# Patient Record
Sex: MALE | Race: OTHER | HISPANIC OR LATINO | URBAN - METROPOLITAN AREA
[De-identification: names, ages, dates, MRNs, and addresses within clinical notes are randomized per-mention and may not be internally consistent; named-entity substitution may affect disease eponyms.]

---

## 2022-04-08 ENCOUNTER — INPATIENT (INPATIENT)
Facility: HOSPITAL | Age: 57
LOS: 1 days | Discharge: HOME | DRG: 871 | End: 2022-04-10
Attending: STUDENT IN AN ORGANIZED HEALTH CARE EDUCATION/TRAINING PROGRAM | Admitting: STUDENT IN AN ORGANIZED HEALTH CARE EDUCATION/TRAINING PROGRAM
Payer: MEDICAID

## 2022-04-08 VITALS
TEMPERATURE: 102 F | WEIGHT: 202.83 LBS | DIASTOLIC BLOOD PRESSURE: 84 MMHG | RESPIRATION RATE: 19 BRPM | SYSTOLIC BLOOD PRESSURE: 145 MMHG | HEIGHT: 67.72 IN | HEART RATE: 119 BPM | OXYGEN SATURATION: 96 %

## 2022-04-08 DIAGNOSIS — G44.209 TENSION-TYPE HEADACHE, UNSPECIFIED, NOT INTRACTABLE: ICD-10-CM

## 2022-04-08 DIAGNOSIS — J18.9 PNEUMONIA, UNSPECIFIED ORGANISM: ICD-10-CM

## 2022-04-08 DIAGNOSIS — A41.50 GRAM-NEGATIVE SEPSIS, UNSPECIFIED: ICD-10-CM

## 2022-04-08 DIAGNOSIS — M54.9 DORSALGIA, UNSPECIFIED: ICD-10-CM

## 2022-04-08 DIAGNOSIS — Z29.9 ENCOUNTER FOR PROPHYLACTIC MEASURES, UNSPECIFIED: ICD-10-CM

## 2022-04-08 LAB
ALBUMIN SERPL ELPH-MCNC: 3.1 G/DL — LOW (ref 3.5–5)
ALP SERPL-CCNC: 84 U/L — SIGNIFICANT CHANGE UP (ref 40–120)
ALT FLD-CCNC: 26 U/L DA — SIGNIFICANT CHANGE UP (ref 10–60)
ANION GAP SERPL CALC-SCNC: 5 MMOL/L — SIGNIFICANT CHANGE UP (ref 5–17)
APPEARANCE UR: CLEAR — SIGNIFICANT CHANGE UP
AST SERPL-CCNC: 20 U/L — SIGNIFICANT CHANGE UP (ref 10–40)
BASOPHILS # BLD AUTO: 0.02 K/UL — SIGNIFICANT CHANGE UP (ref 0–0.2)
BASOPHILS NFR BLD AUTO: 0.1 % — SIGNIFICANT CHANGE UP (ref 0–2)
BILIRUB SERPL-MCNC: 0.5 MG/DL — SIGNIFICANT CHANGE UP (ref 0.2–1.2)
BILIRUB UR-MCNC: NEGATIVE — SIGNIFICANT CHANGE UP
BUN SERPL-MCNC: 13 MG/DL — SIGNIFICANT CHANGE UP (ref 7–18)
CALCIUM SERPL-MCNC: 9.2 MG/DL — SIGNIFICANT CHANGE UP (ref 8.4–10.5)
CHLORIDE SERPL-SCNC: 107 MMOL/L — SIGNIFICANT CHANGE UP (ref 96–108)
CO2 SERPL-SCNC: 25 MMOL/L — SIGNIFICANT CHANGE UP (ref 22–31)
COLOR SPEC: YELLOW — SIGNIFICANT CHANGE UP
CREAT SERPL-MCNC: 1 MG/DL — SIGNIFICANT CHANGE UP (ref 0.5–1.3)
DIFF PNL FLD: NEGATIVE — SIGNIFICANT CHANGE UP
EGFR: 88 ML/MIN/1.73M2 — SIGNIFICANT CHANGE UP
EOSINOPHIL # BLD AUTO: 0.01 K/UL — SIGNIFICANT CHANGE UP (ref 0–0.5)
EOSINOPHIL NFR BLD AUTO: 0.1 % — SIGNIFICANT CHANGE UP (ref 0–6)
FLUAV AG NPH QL: SIGNIFICANT CHANGE UP
FLUBV AG NPH QL: SIGNIFICANT CHANGE UP
GLUCOSE SERPL-MCNC: 149 MG/DL — HIGH (ref 70–99)
GLUCOSE UR QL: NEGATIVE — SIGNIFICANT CHANGE UP
HCT VFR BLD CALC: 39.1 % — SIGNIFICANT CHANGE UP (ref 39–50)
HGB BLD-MCNC: 14.2 G/DL — SIGNIFICANT CHANGE UP (ref 13–17)
IMM GRANULOCYTES NFR BLD AUTO: 0.4 % — SIGNIFICANT CHANGE UP (ref 0–1.5)
KETONES UR-MCNC: NEGATIVE — SIGNIFICANT CHANGE UP
LACTATE SERPL-SCNC: 1.5 MMOL/L — SIGNIFICANT CHANGE UP (ref 0.7–2)
LEUKOCYTE ESTERASE UR-ACNC: NEGATIVE — SIGNIFICANT CHANGE UP
LYMPHOCYTES # BLD AUTO: 1.07 K/UL — SIGNIFICANT CHANGE UP (ref 1–3.3)
LYMPHOCYTES # BLD AUTO: 7.3 % — LOW (ref 13–44)
MCHC RBC-ENTMCNC: 29.8 PG — SIGNIFICANT CHANGE UP (ref 27–34)
MCHC RBC-ENTMCNC: 36.3 GM/DL — HIGH (ref 32–36)
MCV RBC AUTO: 82.1 FL — SIGNIFICANT CHANGE UP (ref 80–100)
MONOCYTES # BLD AUTO: 0.87 K/UL — SIGNIFICANT CHANGE UP (ref 0–0.9)
MONOCYTES NFR BLD AUTO: 6 % — SIGNIFICANT CHANGE UP (ref 2–14)
NEUTROPHILS # BLD AUTO: 12.54 K/UL — HIGH (ref 1.8–7.4)
NEUTROPHILS NFR BLD AUTO: 86.1 % — HIGH (ref 43–77)
NITRITE UR-MCNC: NEGATIVE — SIGNIFICANT CHANGE UP
NRBC # BLD: 0 /100 WBCS — SIGNIFICANT CHANGE UP (ref 0–0)
PH UR: 7 — SIGNIFICANT CHANGE UP (ref 5–8)
PLATELET # BLD AUTO: 220 K/UL — SIGNIFICANT CHANGE UP (ref 150–400)
POTASSIUM SERPL-MCNC: 4.3 MMOL/L — SIGNIFICANT CHANGE UP (ref 3.5–5.3)
POTASSIUM SERPL-SCNC: 4.3 MMOL/L — SIGNIFICANT CHANGE UP (ref 3.5–5.3)
PROT SERPL-MCNC: 8.2 G/DL — SIGNIFICANT CHANGE UP (ref 6–8.3)
PROT UR-MCNC: 15
RBC # BLD: 4.76 M/UL — SIGNIFICANT CHANGE UP (ref 4.2–5.8)
RBC # FLD: 12.3 % — SIGNIFICANT CHANGE UP (ref 10.3–14.5)
SARS-COV-2 RNA SPEC QL NAA+PROBE: SIGNIFICANT CHANGE UP
SODIUM SERPL-SCNC: 137 MMOL/L — SIGNIFICANT CHANGE UP (ref 135–145)
SP GR SPEC: 1.01 — SIGNIFICANT CHANGE UP (ref 1.01–1.02)
UROBILINOGEN FLD QL: NEGATIVE — SIGNIFICANT CHANGE UP
WBC # BLD: 14.57 K/UL — HIGH (ref 3.8–10.5)
WBC # FLD AUTO: 14.57 K/UL — HIGH (ref 3.8–10.5)

## 2022-04-08 PROCEDURE — 71260 CT THORAX DX C+: CPT | Mod: 26,MA

## 2022-04-08 PROCEDURE — 99223 1ST HOSP IP/OBS HIGH 75: CPT | Mod: GC

## 2022-04-08 PROCEDURE — 71045 X-RAY EXAM CHEST 1 VIEW: CPT | Mod: 26

## 2022-04-08 PROCEDURE — 99284 EMERGENCY DEPT VISIT MOD MDM: CPT

## 2022-04-08 PROCEDURE — 74177 CT ABD & PELVIS W/CONTRAST: CPT | Mod: 26,MA

## 2022-04-08 RX ORDER — HEPARIN SODIUM 5000 [USP'U]/ML
5000 INJECTION INTRAVENOUS; SUBCUTANEOUS EVERY 8 HOURS
Refills: 0 | Status: DISCONTINUED | OUTPATIENT
Start: 2022-04-08 | End: 2022-04-10

## 2022-04-08 RX ORDER — ACETAMINOPHEN 500 MG
650 TABLET ORAL EVERY 6 HOURS
Refills: 0 | Status: DISCONTINUED | OUTPATIENT
Start: 2022-04-08 | End: 2022-04-10

## 2022-04-08 RX ORDER — ACETAMINOPHEN 500 MG
1000 TABLET ORAL ONCE
Refills: 0 | Status: COMPLETED | OUTPATIENT
Start: 2022-04-08 | End: 2022-04-08

## 2022-04-08 RX ORDER — CEFTRIAXONE 500 MG/1
1000 INJECTION, POWDER, FOR SOLUTION INTRAMUSCULAR; INTRAVENOUS ONCE
Refills: 0 | Status: COMPLETED | OUTPATIENT
Start: 2022-04-08 | End: 2022-04-08

## 2022-04-08 RX ORDER — KETOROLAC TROMETHAMINE 30 MG/ML
30 SYRINGE (ML) INJECTION ONCE
Refills: 0 | Status: DISCONTINUED | OUTPATIENT
Start: 2022-04-08 | End: 2022-04-08

## 2022-04-08 RX ORDER — SODIUM CHLORIDE 9 MG/ML
2000 INJECTION INTRAMUSCULAR; INTRAVENOUS; SUBCUTANEOUS ONCE
Refills: 0 | Status: COMPLETED | OUTPATIENT
Start: 2022-04-08 | End: 2022-04-08

## 2022-04-08 RX ORDER — AZITHROMYCIN 500 MG/1
500 TABLET, FILM COATED ORAL EVERY 24 HOURS
Refills: 0 | Status: DISCONTINUED | OUTPATIENT
Start: 2022-04-08 | End: 2022-04-10

## 2022-04-08 RX ORDER — AZITHROMYCIN 500 MG/1
500 TABLET, FILM COATED ORAL ONCE
Refills: 0 | Status: COMPLETED | OUTPATIENT
Start: 2022-04-08 | End: 2022-04-08

## 2022-04-08 RX ORDER — CEFTRIAXONE 500 MG/1
1000 INJECTION, POWDER, FOR SOLUTION INTRAMUSCULAR; INTRAVENOUS EVERY 24 HOURS
Refills: 0 | Status: DISCONTINUED | OUTPATIENT
Start: 2022-04-08 | End: 2022-04-10

## 2022-04-08 RX ORDER — ACETAMINOPHEN 500 MG
325 TABLET ORAL ONCE
Refills: 0 | Status: COMPLETED | OUTPATIENT
Start: 2022-04-08 | End: 2022-04-08

## 2022-04-08 RX ORDER — ACETAMINOPHEN 500 MG
650 TABLET ORAL ONCE
Refills: 0 | Status: COMPLETED | OUTPATIENT
Start: 2022-04-08 | End: 2022-04-08

## 2022-04-08 RX ORDER — KETOROLAC TROMETHAMINE 30 MG/ML
30 SYRINGE (ML) INJECTION EVERY 6 HOURS
Refills: 0 | Status: DISCONTINUED | OUTPATIENT
Start: 2022-04-08 | End: 2022-04-10

## 2022-04-08 RX ADMIN — Medication 30 MILLIGRAM(S): at 07:38

## 2022-04-08 RX ADMIN — SODIUM CHLORIDE 2000 MILLILITER(S): 9 INJECTION INTRAMUSCULAR; INTRAVENOUS; SUBCUTANEOUS at 07:02

## 2022-04-08 RX ADMIN — CEFTRIAXONE 100 MILLIGRAM(S): 500 INJECTION, POWDER, FOR SOLUTION INTRAMUSCULAR; INTRAVENOUS at 07:45

## 2022-04-08 RX ADMIN — Medication 325 MILLIGRAM(S): at 23:18

## 2022-04-08 RX ADMIN — Medication 650 MILLIGRAM(S): at 13:33

## 2022-04-08 RX ADMIN — Medication 1000 MILLIGRAM(S): at 17:01

## 2022-04-08 RX ADMIN — AZITHROMYCIN 255 MILLIGRAM(S): 500 TABLET, FILM COATED ORAL at 10:04

## 2022-04-08 RX ADMIN — Medication 400 MILLIGRAM(S): at 16:31

## 2022-04-08 RX ADMIN — Medication 650 MILLIGRAM(S): at 07:38

## 2022-04-08 RX ADMIN — Medication 650 MILLIGRAM(S): at 20:55

## 2022-04-08 RX ADMIN — HEPARIN SODIUM 5000 UNIT(S): 5000 INJECTION INTRAVENOUS; SUBCUTANEOUS at 21:46

## 2022-04-08 RX ADMIN — HEPARIN SODIUM 5000 UNIT(S): 5000 INJECTION INTRAVENOUS; SUBCUTANEOUS at 13:34

## 2022-04-08 RX ADMIN — Medication 650 MILLIGRAM(S): at 07:10

## 2022-04-08 RX ADMIN — Medication 30 MILLIGRAM(S): at 07:11

## 2022-04-08 RX ADMIN — Medication 650 MILLIGRAM(S): at 14:00

## 2022-04-08 RX ADMIN — Medication 650 MILLIGRAM(S): at 21:55

## 2022-04-08 NOTE — H&P ADULT - PROBLEM SELECTOR PLAN 1
- No SOB or chest pain   - CT scan chest and abdomen which showed Right upper lobe masslike consolidation, compatible with pneumonia.    No acute finding on pelvis or abdomen.   - Patient was tachycardiac to 120s in ED likely due to fever - WBCs 14k  - Normal lactate - Normal BP   - Will start on Rocephin and Zithromax   - Monitor vitals   - f/u Blood and urine culture

## 2022-04-08 NOTE — H&P ADULT - ASSESSMENT
Patient is 56 years old male with past medical hx of kidney infection?, Came to ED due to complaining of headache, fever and back pain. Patient reports that he started feeling back pain few days ago and started on Tylenol PRN. Patient got CT scan chest and abdomen which showed Right upper lobe masslike consolidation, compatible with pneumonia. No acute finding on pelvis or abdomen . Patient was tachycardiac to 120s in ED. EKG showed sinus tachycardia. Patient will be admitted for IV antibiotics.

## 2022-04-08 NOTE — H&P ADULT - PROBLEM SELECTOR PLAN 2
- No findings on CT scan   - Pain is improved; No sensory loss or motor weakness   - If symptoms persists; Will scan Cervical and thoracic V  - Tylenol PRN for Pain and headache

## 2022-04-08 NOTE — ED PROVIDER NOTE - OBJECTIVE STATEMENT
56 year old male denies PMH coming in with 3 days of fever, HA, back pain. no known sick contacts. denies abd pains, N/v/D/C, cp, sob, palpitations, urinary complaints, vision changes, neck pain/stiffness, cough, uri symptoms. 56 year old male denies PMH coming in with 3 days of fever, HA, back pain. no known sick contacts. denies abd pains, N/v/D/C, cp, sob, palpitations, urinary complaints, vision changes, neck pain/stiffness, cough, uri symptoms. of note pt states about a month go had his kidney evaluated and was told he may have an infection there but wasn't given any antibiotics or medications for this and is unsure exactly what was wrong.

## 2022-04-08 NOTE — ED PROVIDER NOTE - PROGRESS NOTE DETAILS
CT confirms RUL consolidation.  Pt admitted for IV abx.  MAR and Dr. Hamlin endorsed. Pt agrees with admission.   I had a detailed discussion with the patient and/or guardian regarding the historical points, exam findings, and any diagnostic results supporting the admit diagnosis.

## 2022-04-08 NOTE — H&P ADULT - NSICDXNOPASTSURGICALHX_GEN_ALL_CORE
How Severe Is The Scar?: moderate
Is This A New Presentation, Or A Follow-Up?: Chest Scar
<-- Click to add NO significant Past Surgical History

## 2022-04-08 NOTE — ED ADULT NURSE NOTE - SUICIDE SCREENING DEPRESSION
Negative Azithromycin Counseling:  I discussed with the patient the risks of azithromycin including but not limited to GI upset, allergic reaction, drug rash, diarrhea, and yeast infections.

## 2022-04-08 NOTE — H&P ADULT - HISTORY OF PRESENT ILLNESS
Patient is 56 years old male with past medical hx of kidney infection?, Came to ED due to complaining of headache, fever and back pain. Patient reports that he started feeling back pain few days ago and started on Tylenol PRN. Patient got CT scan chest and abdomen which showed Right upper lobe masslike consolidation, compatible with pneumonia. No acute finding on pelvis or abdomen . Patient was tachycardiac to 120s in ED. Patient denied any chest pain, shortness of breath, N/V or change in bowel movement.

## 2022-04-08 NOTE — PATIENT PROFILE ADULT - SURGICAL SITE INCISION
"Chief Complaint   Patient presents with     URI     /70   Pulse 100   Temp 98.5  F (36.9  C) (Tympanic)   Resp 16   Ht 1.778 m (5' 10\")   Wt 73.5 kg (162 lb)   LMP  (LMP Unknown)   SpO2 98%   Breastfeeding? No   BMI 23.24 kg/m   Estimated body mass index is 23.24 kg/m  as calculated from the following:    Height as of this encounter: 1.778 m (5' 10\").    Weight as of this encounter: 73.5 kg (162 lb).  BP completed using cuff size: regular   Gretchen Herrera CMA    Health Maintenance Due   Topic Date Due     PREVENTIVE CARE VISIT  1994     INFLUENZA VACCINE (1) 09/01/2018     Health Maintenance reviewed at today's visit patient asked to schedule/complete:   Routine Health Visit: Patient agrees to schedule  Immunizations:  Patient agrees to schedule    "
no

## 2022-04-08 NOTE — ED PROVIDER NOTE - CLINICAL SUMMARY MEDICAL DECISION MAKING FREE TEXT BOX
45 year old male with fever/HA/back pain. PE as above.  labs, UA, antipyretics, cxr, flu/covid swab, reassess

## 2022-04-09 LAB
ALBUMIN SERPL ELPH-MCNC: 2.7 G/DL — LOW (ref 3.5–5)
ALP SERPL-CCNC: 73 U/L — SIGNIFICANT CHANGE UP (ref 40–120)
ALT FLD-CCNC: 22 U/L DA — SIGNIFICANT CHANGE UP (ref 10–60)
ANION GAP SERPL CALC-SCNC: 9 MMOL/L — SIGNIFICANT CHANGE UP (ref 5–17)
AST SERPL-CCNC: 15 U/L — SIGNIFICANT CHANGE UP (ref 10–40)
BASOPHILS # BLD AUTO: 0.03 K/UL — SIGNIFICANT CHANGE UP (ref 0–0.2)
BASOPHILS NFR BLD AUTO: 0.3 % — SIGNIFICANT CHANGE UP (ref 0–2)
BILIRUB SERPL-MCNC: 0.3 MG/DL — SIGNIFICANT CHANGE UP (ref 0.2–1.2)
BUN SERPL-MCNC: 16 MG/DL — SIGNIFICANT CHANGE UP (ref 7–18)
CALCIUM SERPL-MCNC: 8.9 MG/DL — SIGNIFICANT CHANGE UP (ref 8.4–10.5)
CHLORIDE SERPL-SCNC: 107 MMOL/L — SIGNIFICANT CHANGE UP (ref 96–108)
CO2 SERPL-SCNC: 23 MMOL/L — SIGNIFICANT CHANGE UP (ref 22–31)
CREAT SERPL-MCNC: 0.93 MG/DL — SIGNIFICANT CHANGE UP (ref 0.5–1.3)
CULTURE RESULTS: NO GROWTH — SIGNIFICANT CHANGE UP
EGFR: 96 ML/MIN/1.73M2 — SIGNIFICANT CHANGE UP
EOSINOPHIL # BLD AUTO: 0.01 K/UL — SIGNIFICANT CHANGE UP (ref 0–0.5)
EOSINOPHIL NFR BLD AUTO: 0.1 % — SIGNIFICANT CHANGE UP (ref 0–6)
GLUCOSE SERPL-MCNC: 120 MG/DL — HIGH (ref 70–99)
HCT VFR BLD CALC: 36.4 % — LOW (ref 39–50)
HCV AB S/CO SERPL IA: 0.09 S/CO — SIGNIFICANT CHANGE UP (ref 0–0.99)
HCV AB SERPL-IMP: SIGNIFICANT CHANGE UP
HGB BLD-MCNC: 13 G/DL — SIGNIFICANT CHANGE UP (ref 13–17)
IMM GRANULOCYTES NFR BLD AUTO: 0.4 % — SIGNIFICANT CHANGE UP (ref 0–1.5)
LYMPHOCYTES # BLD AUTO: 1.66 K/UL — SIGNIFICANT CHANGE UP (ref 1–3.3)
LYMPHOCYTES # BLD AUTO: 15.6 % — SIGNIFICANT CHANGE UP (ref 13–44)
MAGNESIUM SERPL-MCNC: 1.9 MG/DL — SIGNIFICANT CHANGE UP (ref 1.6–2.6)
MCHC RBC-ENTMCNC: 29.1 PG — SIGNIFICANT CHANGE UP (ref 27–34)
MCHC RBC-ENTMCNC: 35.7 GM/DL — SIGNIFICANT CHANGE UP (ref 32–36)
MCV RBC AUTO: 81.6 FL — SIGNIFICANT CHANGE UP (ref 80–100)
MONOCYTES # BLD AUTO: 0.89 K/UL — SIGNIFICANT CHANGE UP (ref 0–0.9)
MONOCYTES NFR BLD AUTO: 8.4 % — SIGNIFICANT CHANGE UP (ref 2–14)
NEUTROPHILS # BLD AUTO: 8 K/UL — HIGH (ref 1.8–7.4)
NEUTROPHILS NFR BLD AUTO: 75.2 % — SIGNIFICANT CHANGE UP (ref 43–77)
NRBC # BLD: 0 /100 WBCS — SIGNIFICANT CHANGE UP (ref 0–0)
PHOSPHATE SERPL-MCNC: 2.6 MG/DL — SIGNIFICANT CHANGE UP (ref 2.5–4.5)
PLATELET # BLD AUTO: 217 K/UL — SIGNIFICANT CHANGE UP (ref 150–400)
POTASSIUM SERPL-MCNC: 4 MMOL/L — SIGNIFICANT CHANGE UP (ref 3.5–5.3)
POTASSIUM SERPL-SCNC: 4 MMOL/L — SIGNIFICANT CHANGE UP (ref 3.5–5.3)
PROT SERPL-MCNC: 7.1 G/DL — SIGNIFICANT CHANGE UP (ref 6–8.3)
RBC # BLD: 4.46 M/UL — SIGNIFICANT CHANGE UP (ref 4.2–5.8)
RBC # FLD: 12.2 % — SIGNIFICANT CHANGE UP (ref 10.3–14.5)
SODIUM SERPL-SCNC: 139 MMOL/L — SIGNIFICANT CHANGE UP (ref 135–145)
SPECIMEN SOURCE: SIGNIFICANT CHANGE UP
WBC # BLD: 10.63 K/UL — HIGH (ref 3.8–10.5)
WBC # FLD AUTO: 10.63 K/UL — HIGH (ref 3.8–10.5)

## 2022-04-09 PROCEDURE — 99233 SBSQ HOSP IP/OBS HIGH 50: CPT

## 2022-04-09 RX ADMIN — HEPARIN SODIUM 5000 UNIT(S): 5000 INJECTION INTRAVENOUS; SUBCUTANEOUS at 21:46

## 2022-04-09 RX ADMIN — HEPARIN SODIUM 5000 UNIT(S): 5000 INJECTION INTRAVENOUS; SUBCUTANEOUS at 06:04

## 2022-04-09 RX ADMIN — CEFTRIAXONE 100 MILLIGRAM(S): 500 INJECTION, POWDER, FOR SOLUTION INTRAMUSCULAR; INTRAVENOUS at 08:15

## 2022-04-09 RX ADMIN — HEPARIN SODIUM 5000 UNIT(S): 5000 INJECTION INTRAVENOUS; SUBCUTANEOUS at 14:52

## 2022-04-09 RX ADMIN — AZITHROMYCIN 255 MILLIGRAM(S): 500 TABLET, FILM COATED ORAL at 11:08

## 2022-04-09 RX ADMIN — Medication 325 MILLIGRAM(S): at 00:18

## 2022-04-09 RX ADMIN — Medication 650 MILLIGRAM(S): at 09:54

## 2022-04-09 RX ADMIN — Medication 650 MILLIGRAM(S): at 10:24

## 2022-04-09 NOTE — PROGRESS NOTE ADULT - SUBJECTIVE AND OBJECTIVE BOX
S: Patient still febrile. Reports that headache and bodyache has improved.    O:  Vital Signs Last 24 Hrs  T(C): 36.8 (09 Apr 2022 13:31), Max: 39.4 (08 Apr 2022 20:39)  T(F): 98.2 (09 Apr 2022 13:31), Max: 102.9 (08 Apr 2022 20:39)  HR: 81 (09 Apr 2022 13:31) (81 - 111)  BP: 117/69 (09 Apr 2022 13:31) (117/69 - 135/83)  BP(mean): --  RR: 18 (09 Apr 2022 13:31) (18 - 19)  SpO2: 97% (09 Apr 2022 13:31) (96% - 97%)    GENERAL: NAD, well-developed  HEAD:  Atraumatic, Normocephalic  EYES: EOMI, PERRLA, conjunctiva and sclera clear  NECK: Supple, No JVD  CHEST/LUNG: Clear to auscultation bilaterally; No wheeze  HEART: Regular rate and rhythm; No murmurs, rubs, or gallops  ABDOMEN: Soft, Nontender, Nondistended; Bowel sounds present  EXTREMITIES:  2+ Peripheral Pulses, No clubbing, cyanosis, or edema  PSYCH: AAOx3  NEUROLOGY: non-focal  SKIN: No rashes or lesions    acetaminophen     Tablet .. 650 milliGRAM(s) Oral every 6 hours PRN  azithromycin  IVPB 500 milliGRAM(s) IV Intermittent every 24 hours  cefTRIAXone   IVPB 1000 milliGRAM(s) IV Intermittent every 24 hours  heparin   Injectable 5000 Unit(s) SubCutaneous every 8 hours  ketorolac   Injectable 30 milliGRAM(s) IV Push every 6 hours PRN                            13.0   10.63 )-----------( 217      ( 09 Apr 2022 06:32 )             36.4       04-09    139  |  107  |  16  ----------------------------<  120<H>  4.0   |  23  |  0.93    Ca    8.9      09 Apr 2022 06:32  Phos  2.6     04-09  Mg     1.9     04-09    TPro  7.1  /  Alb  2.7<L>  /  TBili  0.3  /  DBili  x   /  AST  15  /  ALT  22  /  AlkPhos  73  04-09

## 2022-04-09 NOTE — PROGRESS NOTE ADULT - ASSESSMENT
55yo M no PMHx who presented with Sepsis due to RUL Pneumonia on IV Abx.    #Sepsis  #RUL Pneumonia, suspect due to Gram-negative Organism  #Headache    -ceftriaxone and azithromycin  -tylenol for fever and headache  -f/u BC  -if afebrile for 24 hours, possible discharge tomorrow on oral abx

## 2022-04-09 NOTE — PHARMACOTHERAPY INTERVENTION NOTE - COMMENTS
On /Azithro for management of pneumonia. Tmax 24 hours 102F, reasonable to continue IV ABX at this time.

## 2022-04-10 VITALS
SYSTOLIC BLOOD PRESSURE: 125 MMHG | DIASTOLIC BLOOD PRESSURE: 69 MMHG | RESPIRATION RATE: 18 BRPM | OXYGEN SATURATION: 95 % | TEMPERATURE: 97 F | HEART RATE: 85 BPM

## 2022-04-10 LAB
ANION GAP SERPL CALC-SCNC: 8 MMOL/L — SIGNIFICANT CHANGE UP (ref 5–17)
BUN SERPL-MCNC: 16 MG/DL — SIGNIFICANT CHANGE UP (ref 7–18)
CALCIUM SERPL-MCNC: 9.3 MG/DL — SIGNIFICANT CHANGE UP (ref 8.4–10.5)
CHLORIDE SERPL-SCNC: 107 MMOL/L — SIGNIFICANT CHANGE UP (ref 96–108)
CO2 SERPL-SCNC: 26 MMOL/L — SIGNIFICANT CHANGE UP (ref 22–31)
CREAT SERPL-MCNC: 0.93 MG/DL — SIGNIFICANT CHANGE UP (ref 0.5–1.3)
EGFR: 96 ML/MIN/1.73M2 — SIGNIFICANT CHANGE UP
GLUCOSE SERPL-MCNC: 117 MG/DL — HIGH (ref 70–99)
HCT VFR BLD CALC: 36.4 % — LOW (ref 39–50)
HGB BLD-MCNC: 13 G/DL — SIGNIFICANT CHANGE UP (ref 13–17)
MCHC RBC-ENTMCNC: 29.1 PG — SIGNIFICANT CHANGE UP (ref 27–34)
MCHC RBC-ENTMCNC: 35.7 GM/DL — SIGNIFICANT CHANGE UP (ref 32–36)
MCV RBC AUTO: 81.4 FL — SIGNIFICANT CHANGE UP (ref 80–100)
NRBC # BLD: 0 /100 WBCS — SIGNIFICANT CHANGE UP (ref 0–0)
PLATELET # BLD AUTO: 235 K/UL — SIGNIFICANT CHANGE UP (ref 150–400)
POTASSIUM SERPL-MCNC: 4 MMOL/L — SIGNIFICANT CHANGE UP (ref 3.5–5.3)
POTASSIUM SERPL-SCNC: 4 MMOL/L — SIGNIFICANT CHANGE UP (ref 3.5–5.3)
RBC # BLD: 4.47 M/UL — SIGNIFICANT CHANGE UP (ref 4.2–5.8)
RBC # FLD: 12.8 % — SIGNIFICANT CHANGE UP (ref 10.3–14.5)
SODIUM SERPL-SCNC: 141 MMOL/L — SIGNIFICANT CHANGE UP (ref 135–145)
WBC # BLD: 7.7 K/UL — SIGNIFICANT CHANGE UP (ref 3.8–10.5)
WBC # FLD AUTO: 7.7 K/UL — SIGNIFICANT CHANGE UP (ref 3.8–10.5)

## 2022-04-10 PROCEDURE — 99239 HOSP IP/OBS DSCHRG MGMT >30: CPT

## 2022-04-10 PROCEDURE — 86803 HEPATITIS C AB TEST: CPT

## 2022-04-10 PROCEDURE — 83605 ASSAY OF LACTIC ACID: CPT

## 2022-04-10 PROCEDURE — 85025 COMPLETE CBC W/AUTO DIFF WBC: CPT

## 2022-04-10 PROCEDURE — 80048 BASIC METABOLIC PNL TOTAL CA: CPT

## 2022-04-10 PROCEDURE — 71260 CT THORAX DX C+: CPT | Mod: MA

## 2022-04-10 PROCEDURE — 96374 THER/PROPH/DIAG INJ IV PUSH: CPT

## 2022-04-10 PROCEDURE — 36415 COLL VENOUS BLD VENIPUNCTURE: CPT

## 2022-04-10 PROCEDURE — 81001 URINALYSIS AUTO W/SCOPE: CPT

## 2022-04-10 PROCEDURE — 99285 EMERGENCY DEPT VISIT HI MDM: CPT | Mod: 25

## 2022-04-10 PROCEDURE — 83735 ASSAY OF MAGNESIUM: CPT

## 2022-04-10 PROCEDURE — 96375 TX/PRO/DX INJ NEW DRUG ADDON: CPT

## 2022-04-10 PROCEDURE — 85027 COMPLETE CBC AUTOMATED: CPT

## 2022-04-10 PROCEDURE — 87637 SARSCOV2&INF A&B&RSV AMP PRB: CPT

## 2022-04-10 PROCEDURE — 80053 COMPREHEN METABOLIC PANEL: CPT

## 2022-04-10 PROCEDURE — 71045 X-RAY EXAM CHEST 1 VIEW: CPT

## 2022-04-10 PROCEDURE — 87040 BLOOD CULTURE FOR BACTERIA: CPT

## 2022-04-10 PROCEDURE — 87086 URINE CULTURE/COLONY COUNT: CPT

## 2022-04-10 PROCEDURE — 84100 ASSAY OF PHOSPHORUS: CPT

## 2022-04-10 PROCEDURE — 74177 CT ABD & PELVIS W/CONTRAST: CPT | Mod: MA

## 2022-04-10 RX ORDER — AMOXICILLIN 250 MG/5ML
1 SUSPENSION, RECONSTITUTED, ORAL (ML) ORAL
Qty: 8 | Refills: 0
Start: 2022-04-10 | End: 2022-04-13

## 2022-04-10 RX ADMIN — AZITHROMYCIN 255 MILLIGRAM(S): 500 TABLET, FILM COATED ORAL at 12:13

## 2022-04-10 RX ADMIN — CEFTRIAXONE 100 MILLIGRAM(S): 500 INJECTION, POWDER, FOR SOLUTION INTRAMUSCULAR; INTRAVENOUS at 08:26

## 2022-04-10 RX ADMIN — HEPARIN SODIUM 5000 UNIT(S): 5000 INJECTION INTRAVENOUS; SUBCUTANEOUS at 13:48

## 2022-04-10 RX ADMIN — HEPARIN SODIUM 5000 UNIT(S): 5000 INJECTION INTRAVENOUS; SUBCUTANEOUS at 06:24

## 2022-04-10 NOTE — DISCHARGE NOTE PROVIDER - NSDCPNSUBOBJ_GEN_ALL_CORE
#RUL Pneumonia, suspect due to Gram-negative organism  #Sepsis    Patient improved, to be discharged on amoxicillin 875mg BID for 4 more days. Patient to follow-up with PCP, but can also go to [facility]. Gave patient return instructions if fever, cough or shortness of breath worsens.

## 2022-04-10 NOTE — DISCHARGE NOTE PROVIDER - HOSPITAL COURSE
56 years old male with no significant pmhx presented with complaining of  fever and back pain.   CXR with RUL infiltrate  CT scan chest and abdomen which showed Right upper lobe masslike consolidation, compatible with pneumonia. No acute finding on pelvis or abdomen .   Pt was admitted for CAP and started on Rocephin and Zithromax  Blood cultures negative  Pt remains afebrile, no leucocytosis   Given patient's improved clinical status and current hemodynamic stability, decision was made to discharge.  Pt recommended to continue antibiotic Amoxicillin for 4 more days and follow up with his PCP for reevaluation

## 2022-04-10 NOTE — DISCHARGE NOTE PROVIDER - CARE PROVIDER_API CALL
Gabrielle Minor (MD; MPH)  Internal Medicine  [address] [place] [address]  Phone: [phone]  Fax: [phone]  Follow Up Time:

## 2022-04-10 NOTE — DISCHARGE NOTE NURSING/CASE MANAGEMENT/SOCIAL WORK - PATIENT PORTAL LINK FT
You can access the FollowMyHealth Patient Portal offered by [facility] by registering at the following website: [email or web address]. By joining [facility]’s FollowMyHealth portal, you will also be able to view your health information using other applications (apps) compatible with our system.

## 2022-04-10 NOTE — DISCHARGE NOTE PROVIDER - NSDCCPCAREPLAN_GEN_ALL_CORE_FT
PRINCIPAL DISCHARGE DIAGNOSIS  Diagnosis: Focal pneumonia  Assessment and Plan of Treatment: You were admitted for Pneumonia. You were treated with antibiotic  Please continue oral antibiotic as prescribed Amoxicillin 875 mg every 12 hrs for 4 days   Pneumonia is a lung infection that can cause a fever, cough, and trouble breathing.  Nutrition is important, eat small frequent meals.  Get lots of rest and drink fluids.  Call your health care provider upon arrival home from hospital and make a follow up appointment for one week.  If your cough worsens, you develop fever greater than 101', you have shaking chills, a fast heartbeat, trouble breathing and/or feel your are breathing much faster than usual, call your healthcare provider.  Make sure you wash your hands frequently.

## 2022-04-10 NOTE — DISCHARGE NOTE NURSING/CASE MANAGEMENT/SOCIAL WORK - NSDCPEFALRISK_GEN_ALL_CORE
For information on Fall & Injury Prevention, visit: [email or web address] OR  [email or web address] OR  https://www.cdc.gov/steadi/patient.html

## 2022-04-12 NOTE — H&P ADULT - NSHPPHYSICALEXAM_GEN_ALL_CORE
Pt called requesting to speak with a nurse, Avery Anand in particular. Pt would like to discuss getting on short term disability and her lingering covid sxs, SOB. Pt scheduled an appt but is requesting to speak with a nurse.     Future Appointments   Date Time Provider Alexis To   4/21/2022 12:30 PM Rocio Vasquez MD [facility] [place] [facility] Vital Signs Last 24 Hrs  T(C): 37.8 (08 Apr 2022 07:16), Max: 38.8 (08 Apr 2022 06:06)  T(F): 100.1 (08 Apr 2022 07:16), Max: 101.8 (08 Apr 2022 06:06)  HR: 105 (08 Apr 2022 07:16) (105 - 119)  BP: 128/80 (08 Apr 2022 07:16) (128/80 - 145/84)  RR: 18 (08 Apr 2022 07:16) (18 - 19)  SpO2: 99% (08 Apr 2022 07:16) (96% - 99%)    GENERAL: NAD, lying in bed comfortably, AAOx3  HEAD:  Atraumatic, Normocephalic  EYES: EOMI, PERRLA, conjunctiva and sclera clear  ENT: Moist mucous membranes  NECK: Supple, No JVD  CHEST/LUNG: Clear to auscultation bilaterally; No rales, rhonchi, wheezing, or rubs. Unlabored respirations  HEART: Regular rate and rhythm; No murmurs, rubs, or gallops  ABDOMEN: Bowel sounds present; Soft, Nontender, Nondistended. No hepatomegally  EXTREMITIES:  2+ Peripheral Pulses, brisk capillary refill. No clubbing, cyanosis, or edema  NERVOUS SYSTEM:  Alert & Oriented X3, speech clear. No deficits   MSK: Cervical back pain improved after Tylenol PRN   SKIN: No rashes or lesions

## 2022-04-13 LAB
CULTURE RESULTS: SIGNIFICANT CHANGE UP
CULTURE RESULTS: SIGNIFICANT CHANGE UP
SPECIMEN SOURCE: SIGNIFICANT CHANGE UP
SPECIMEN SOURCE: SIGNIFICANT CHANGE UP

## 2023-09-12 NOTE — H&P ADULT - ATTENDING COMMENTS
Patient presents reporting flu like symptoms since Friday - dry cough, pressure behind left ear, fatigue, headache and fevers. Patient was seen at urgent care and had negative flu/covid tests. Patient reports fevers of 102F today, did not take any antipyretics prior to coming to the hospital.  
57yo M no PMHx who presented with fever and myalgia for the past 2 days. Patient states that he spoke with a friend who told him to come to the hospital due to persistent high-grade fever. In the ED, patient was febrile and tachycardic to the 120s. WBC 14. CXR with RUL infiltrate. CT Chest/Abd/Pelvis with RUL consolidation. Patient admitted for sepsis due to pneumonia. Lactate normal.    #Sepsis  #RUL Pneumonia, suspect due to Gram-negative Organism  #Headache  #Hyperglycemia    -ceftriaxone and azithromycin  -tylenol for fever and headache  -f/u BC  -monitor glucose on BMP, if remains elevated can check HgbA1c or fingerstick

## 2025-03-12 NOTE — DISCHARGE NOTE PROVIDER - NSDCQMERRANDS_GEN_ALL_CORE
GI INPATIENT PROGRESS NOTE    SUBJECTIVE:      Remains intubated and sedated  Minimal pressors  Family meeting scheduled for today      MEDICATIONS:       Current Facility-Administered Medications   Medication Dose Route Frequency Provider Last Rate Last Admin    pantoprazole (PROTONIX INJECT) injection 40 mg  40 mg Intravenous 2 times per day Shlely Avalos MD   40 mg at 03/12/25 0619    potassium CHLORIDE 20 mEq/100mL IVPB premix  20 mEq Intravenous Once Gm Bro MD 50 mL/hr at 03/12/25 1500 Rate Verify at 03/12/25 1500    albumin human injection 12.5 g  12.5 g Intravenous 3 times per day Luciano Franks DO   Completed at 03/12/25 1326    chlorhexidine gluconate (PERIDEX) 0.12 % solution 15 mL  15 mL Swish & Spit 2 times per day Kike Pineda MD   15 mL at 03/12/25 0828    And    chlorhexidine gluconate (PERIDEX) 0.12 % solution 15 mL  15 mL Swish & Spit PRN Kike Pineda MD        docusate sodium-sennosides (SENOKOT S) 50-8.6 MG 2 tablet  2 tablet Per NG Tube QHS Kike Pineda MD        polyethylene glycol (MIRALAX) packet 17 g  17 g Per NG Tube Daily PRN Kike Pineda MD        Or    bisacodyl (DULCOLAX) suppository 10 mg  10 mg Rectal Daily PRN Kike Pineda MD        CARBOXYMethylcellulose PF (REFRESH PLUS) 0.5 % ophthalmic solution 1 drop  1 drop Both Eyes 6 times per day Kike Pineda MD   1 drop at 03/12/25 1130    CARBOXYMethylcellulose PF (REFRESH PLUS) 0.5 % ophthalmic solution 1 drop  1 drop Both Eyes PRN Kike Pineda MD        propofol (DIPRIVAN) infusion  0-50 mcg/kg/min (Dosing Weight) Intravenous Continuous Kike Pineda MD   Stopped at 03/11/25 0905    fentaNYL (SUBLIMAZE) 2,500 mcg/250 mL in sodium chloride 0.9 % infusion  0-150 mcg/hr Intravenous Continuous Kike Pineda MD 15 mL/hr at 03/12/25 1500 150 mcg/hr at 03/12/25 1500    And    fentaNYL bolus from bag  mcg   mcg Intravenous Q15 Min PRN Kike Pineda MD        chlorhexidine gluconate (PERIDEX) 0.12 %  solution 15 mL  15 mL Swish & Spit PRN Diogo Hamilton MD        enoxaparin (LOVENOX) injection 40 mg  40 mg Subcutaneous Daily Diogo Hamilton MD   40 mg at 03/12/25 0827    sodium chloride 0.9% infusion   Intravenous Continuous PRN Diogo Hamilton MD        sodium chloride 0.9% infusion   Intravenous Continuous PRN Diogo Hamilton MD        sodium chloride 0.9 % injection 10 mL  10 mL Intravenous PRN Diogo Hamilton MD        dexMEDEtomidine (PRECEDEX) 400 mcg/100 mL in sodium chloride 0.9 % infusion  0-1.5 mcg/kg/hr (Dosing Weight) Intravenous Continuous Diogo Hamilton MD 41.7 mL/hr at 03/12/25 1500 1.5 mcg/kg/hr at 03/12/25 1500    NORepinephrine (LEVOPHED) 8 mg/250 mL in dextrose 5 % infusion  0-30 mcg/min Intravenous Continuous Diogo Hamilton MD   Completed at 03/12/25 1331    Potassium Standard Replacement Protocol (Levels 3.5 and lower)   Does not apply See Admin Instructions Gm Bro MD        sodium chloride 0.9 % injection 10 mL  10 mL Intravenous PRN Gm Bro MD        Potassium Standard Replacement Protocol (Levels 3.5 and lower)   Does not apply See Admin Instructions Gm Bro MD        meropenem (MERREM) 1 g in sodium chloride 0.9 % 100 mL IVPB  1 g Intravenous 3 times per day Bri Montez MD 33.3 mL/hr at 03/12/25 1500 Rate Verify at 03/12/25 1500    VANCOMYCIN - PHARMACIST MONITORED Misc   Does not apply See Admin Instructions Bri Montez MD        vancomycin 1,250 mg in sodium chloride 0.9% 250 mL IVPB  1,250 mg Intravenous Q12H Bri Montez MD   Completed at 03/12/25 1331    valproate (DEPACON) 130 mg in dextrose 5 % 50 mL IVPB  130 mg Intravenous 4 times per day Shelly Avalos MD   Completed at 03/12/25 1422    sodium chloride 0.9 % injection 10 mL  10 mL Injection PRN Luciano Franks DO        sodium chloride 0.9 % injection 10 mL  10 mL Injection 2 times per day Luciano Franks DO   10 mL at 03/12/25  0828    sodium chloride 0.9% infusion   Intravenous Continuous Diogo Hamilton MD        potassium CHLORIDE (KLOR-CON M) genie ER tablet 40 mEq  40 mEq Oral Once Arlyn Cruz MD        spironolactone (ALDACTONE) tablet 25 mg  25 mg Oral BID Shelly Avalos MD   25 mg at 03/10/25 0930    albuterol inhaler 2 puff  2 puff Inhalation Q4H Resp PRN Printz, Luciano A, DO   2 puff at 03/06/25 0353    ipratropium-albuterol (DUONEB) 0.5-2.5 (3) MG/3ML nebulizer solution 3 mL  3 mL Nebulization Q6H Resp PRN Printz, Luciano A, DO   3 mL at 03/07/25 1615    lipase-protease-amylase 24,000-76,000-120,000 units (CREON) per capsule 4 capsule  4 capsule Oral TID  Jerry Jones MD   4 capsule at 03/10/25 1717    menthol-zinc oxide (CALMOSEPTINE) 0.44-20.6 % ointment   Topical PRN Printz, Luciano A, DO        collagenase (SANTYL) ointment   Topical Daily Printz, Luciano A, DO   Given at 03/12/25 0828    HYDROmorphone (DILAUDID) injection 1 mg  1 mg Intravenous Q2H PRN Yossi Thomas MD   1 mg at 03/10/25 1319    midodrine (PROAMATINE) tablet 5 mg  5 mg Oral TID  Gm Bro MD   5 mg at 03/12/25 0649    levothyroxine (SYNTHROID, LEVOTHROID) tablet 50 mcg  50 mcg Oral QAM AC Arlyn Cruz MD   50 mcg at 03/12/25 0619    sodium chloride 0.9 % injection 10 mL  10 mL Injection 2 times per day Gm Bro MD   10 mL at 03/12/25 0828    sodium chloride 0.9 % injection 10 mL  10 mL Injection PRN Gm Bro MD   10 mL at 02/24/25 2024    dextrose 50 % injection 25 g  25 g Intravenous PRN Arlyn Cruz MD        dextrose 50 % injection 12.5 g  12.5 g Intravenous PRN Arlyn Cruz MD   12.5 g at 03/03/25 0534    glucagon (GLUCAGEN) injection 1 mg  1 mg Intramuscular PRN Arlyn Cruz MD        dextrose (GLUTOSE) 40 % gel 15 g  15 g Oral PRN Arlyn Cruz MD        dextrose (GLUTOSE) 40 % gel 30 g  30 g Oral PRN Arlyn Cruz MD        gabapentin (NEURONTIN) capsule  300 mg  300 mg Oral 3 times per day Arlyn Cruz MD   300 mg at 03/10/25 1313    thiamine (VITAMIN B1) tablet 100 mg  100 mg Oral Daily Arlyn Cruz MD   100 mg at 03/10/25 0930    midodrine (PROAMATINE) tablet 10 mg  10 mg Oral Q8H PRN hSelly Avalos MD   10 mg at 03/09/25 0915    sodium chloride 0.9 % injection 10 mL  10 mL Intravenous PRN Shelly Avalos MD        Potassium Standard Replacement Protocol (Levels 3.5 and lower)   Does not apply See Admin Instructions Vlad Pino MD        Potassium Replacement (Levels 3.6 - 4)   Does not apply See Admin Instructions Vlad Pino MD        Magnesium Standard Replacement Protocol   Does not apply See Admin Instructions Vlad Pino MD        Phosphorus Standard Replacement Protocol   Does not apply See Admin Instructions Vlad Pino MD        ondansetron (ZOFRAN) injection 4 mg  4 mg Intravenous BID PRN Vlad Pino MD   4 mg at 03/08/25 1245    sodium chloride 0.9 % injection 10 mL  10 mL Intravenous PRN Vlad Pino MD        melatonin tablet 9 mg  9 mg Oral Nightly Arlyn Cruz MD   9 mg at 03/09/25 2102    nystatin (MYCOSTATIN) powder   Topical TID Arlyn Cruz MD   Given at 03/12/25 1330         REVIEW OF SYSTEMS:      14 systems reviewed and are negative except what is documented in the HPI.     PHYSICAL EXAM:    Vital Signs: Blood pressure 99/64, pulse 84, temperature 97 °F (36.1 °C), temperature source Axillary, resp. rate (!) 22, height 6' 2.8\" (1.9 m), weight 134.4 kg (296 lb 4.8 oz), SpO2 99%.  General: The patient is well developed, well nourished, in no acute distress, appears stated age.   Neurologic: Alert and oriented. normal speech, no gross tremor.  Psych: Normal mood and affect,  Skin: Warm and dry, normal turgor.   HEENT:  Normocephalic. Normal conjunctivae and sclerae.  Extraocular movements intact.Oropharynx clear, moist mucous membranes.  Respiratory:  No apparent respiratory distress.  Cardiovascular: Regular rate and normal rhythm.   Extremities: No swelling or tenderness.  Gastrointestinal:  Soft and nontender.  Normal bowel sounds.   Rectal: Deferred     LABORATORY DATA:   No results displayed because visit has over 200 results.              ASSESSMENT/PLAN:      Mr Rivera is a 57 year old man with PMH substance abuse (ETOH), presumed ETOH cirrhosis (newly diagnosed ), Afib on Xarelto, CAD, large abdominal wall hernia with open would who was admitted for cardiac arrest 2/21/25.  Hospital course complicated by polymicrobial sepsis.  GI initially consulted 2/22-2/24/2025 for ETOH liver disease and elevated LFT with anemia.  GI reconsulted 3/5/2025 for severe diarrhea.         Severe diarrhea  -  C.diff and GI panel negative  -  Was receiving Magnesium oxide and flagyl/ceftriaxone  -  ? Secondary to pancreatic insufficiency  -  PERT re-started 3/5, and increased to 4 capsules with meals (24K lipase per capsule)--> (OUT OF ENZYMES AND AWAITING [facility] FOR DELIVER-LAST DOSE 3/5/2025)  -  Diarrhea may be at part be due to malabsorption, will resume PERT when available from [facility]--> RESUMED MORNING OF 3/9/2025  -  PERT on hold as patient intubated overnight for PEA arrest 3/11/2025.       Pancreatic insufficiency  -  Continue PERT with meals (OUT OF ENZYMES AND AWAITING [facility] FOR DELIVER-LAST DOSE 3/5/2025)--> RESUMED MORNING of 3/9/2025  -  PERT on hold as patient intubated overnight for PEA arrest 3/11/2025.       SB and colonic inflammation on previous CT  -  CTAP without contrast 3/6/2025: marked diffuse colonic wall thickening; diffuse mesenteric fluid/ascites (small)/presacral edema likely reactive inflammatory changes; fatty liver; s/p cholecystectomy with CBD up to 11 mm, howevr also suggestion of biliary wall thickening which can be seen in with inflammation;   -  Borderline/indeterminate calprotectin of 113 on 2/13/2025.     4.  Chronic/subacute epigastric pain  -   History of perforated PUD 2018  -  ? Due to chronic pancreatitis but r/o PUD  -  EGD cancelled 3/7/2025 due to worsening hypoxia requiring 10L O2.     5.  CBD 1 mm s/p cholecystectomy  -  LFT elevated with , TB 7, ALT 64, AST 92 2/25/2025-->-->--> , TB 8.9, ALT 14, AST 38 (ALP elevation in past but normal TB)--> LFT continues to rise now also partially contributed to PEA arrest 3/11/2025  -  Likely d/t EtOH cirrhosis. Previous MRCP in 2022 showed 9mm CBD and no biliary lesions. Will recheck serologic work-up. Continue supportive care. Could consider repeating MRCP for completeness when pt's breathing is better.      6.  Anemia, chronic  -  No ricco GIB history  -  Hbg stable.     7.  Cirrhosis, ? MASH +/- ETOH history  -  TB slowing rising but otherwise stable LFT-->LFT continues to rise now also partially contributed to PEA arrest 3/11/2025  -  Continue to monitor LFT  -  Check Ammonia levels.     8.  S/p gastric bypass (? Joselito [facility])     9.  Sepsis, POLYMICROBIAL,   -  New with ?intraabdominal vs urinary source  -  Defer to ID     10.  Hypoxic respiratory failure  -  On NC at 10 L  -  Patient continues to refuse BIPAP per request  -  Defer to pulmonary.     11.  S/p Cardiac arrest thought to be due to severe hypotension secondary to analgesic use  -  Defer to primary service     12.  PEA ARREST 3/11/2025:  -  Patient with multiple comorbidities and also noncomplaint with medical recommendations this hospitalization  -  Was being treated for polypmicrobial sepsis and had chronic hypoxic respiratory failure but refused BIPAP for several days.  -  Defer management to ICU team           Defer all other medical problems to primary service.      Patient with multiple comorbid conditions  Family meeting scheduled for today.        Thank you for involving me in the care of this patient.    Iraida Olivares M.D. Gastroenterology         No